# Patient Record
Sex: MALE | Race: BLACK OR AFRICAN AMERICAN | Employment: FULL TIME | URBAN - METROPOLITAN AREA
[De-identification: names, ages, dates, MRNs, and addresses within clinical notes are randomized per-mention and may not be internally consistent; named-entity substitution may affect disease eponyms.]

---

## 2020-09-05 ENCOUNTER — HOSPITAL ENCOUNTER (EMERGENCY)
Facility: CLINIC | Age: 35
Discharge: HOME | End: 2020-09-05
Attending: PHYSICIAN ASSISTANT | Admitting: PHYSICIAN ASSISTANT

## 2020-09-05 ENCOUNTER — APPOINTMENT (OUTPATIENT)
Dept: GENERAL RADIOLOGY | Facility: CLINIC | Age: 35
End: 2020-09-05
Attending: PHYSICIAN ASSISTANT

## 2020-09-05 ENCOUNTER — HOSPITAL ENCOUNTER (EMERGENCY)
Facility: CLINIC | Age: 35
End: 2020-09-05
Payer: COMMERCIAL

## 2020-09-05 VITALS
SYSTOLIC BLOOD PRESSURE: 140 MMHG | HEART RATE: 82 BPM | OXYGEN SATURATION: 100 % | TEMPERATURE: 98.9 F | DIASTOLIC BLOOD PRESSURE: 88 MMHG | RESPIRATION RATE: 16 BRPM

## 2020-09-05 DIAGNOSIS — S69.92XA INJURY OF LEFT THUMB, INITIAL ENCOUNTER: ICD-10-CM

## 2020-09-05 DIAGNOSIS — S60.10XA SUBUNGUAL HEMATOMA OF DIGIT OF HAND, INITIAL ENCOUNTER: ICD-10-CM

## 2020-09-05 PROCEDURE — 73140 X-RAY EXAM OF FINGER(S): CPT | Mod: LT

## 2020-09-05 PROCEDURE — 99283 EMERGENCY DEPT VISIT LOW MDM: CPT

## 2020-09-05 PROCEDURE — 11740 EVACUATION SUBUNGUAL HMTMA: CPT

## 2020-09-05 ASSESSMENT — ENCOUNTER SYMPTOMS
NUMBNESS: 1
MYALGIAS: 1

## 2020-09-05 NOTE — LETTER
September 5, 2020      To Whom It May Concern:      Ken Kong was seen in our Emergency Department today, 09/05/20.  I expect his condition to improve over the next 2 days.  He may return to work when improved.    Sincerely,        Duke Ba PA-C

## 2020-09-05 NOTE — ED PROVIDER NOTES
History     Chief Complaint: Thumb Discomfort       HPI   Ken Kong is a left-handed 34 year old male who presents with thumb pain. The patient reports that he was adjusting [employer] boulders when his thumb was caught between two of them, at which point he quickly pulled his thumb out. He states that he did not immediately notice any injury, but over time the thumb became visibly bruised, sore, and difficult to move. At this time, his thumb is in pain, with a throbbing sensation through it, and the distal portion is numb. He denies an obvious crush injury, and the rest of his hand is not in any pain.    Allergies:  No known drug allergies     Medications:    The patient is currently on no regular medications.    Past Medical History:    History reviewed. The patient does not have any past pertinent medical history.    Past Surgical History:    History reviewed. No pertinent surgical history.      Family History:    History reviewed. No pertinent family history.       Social History:  The patient works in [employer] and [employer].      Review of Systems   Musculoskeletal: Positive for myalgias (left thumb).   Neurological: Positive for numbness (distal left thumb).   All other systems reviewed and are negative.      Physical Exam     Patient Vitals for the past 24 hrs:   BP Temp Temp src Pulse Resp SpO2   09/05/20 1000 (!) 145/100 98.9  F (37.2  C) Oral 90 18 100 %       Physical Exam  HEENT: mmm  Eyes: PERRL B/L  Neck: Supple  CV: Peripheral pulses intact and regular  Resp: Speaking in full sentences without any respiratory distress  Ext:  Left Hand  TTP of distal phalanx of thumb  Full ROM of thumb.  Sensation intact to thumb distally.  <2 sec cap refill.  Normal strength against resistance of thumb.  Otherwise normal ROM, no TTP, sensation intact, <2 sec cap refill.  Remainder of the skeletal survey is unremarkable  Skin: warm dry well perfused  Neuro: Alert  Nursing notes and vital signs  reviewed.    Emergency Department Course   Procedures:  PROCEDURE:Nail trephination  Indication: subungal hematoma  The risks and benefits of the procedure were discussed with the patient.   Narrative: The nail was cleaned with an alcohol swab.  A cautery and then 18 gauge needle was used to trephinate the nail.  A satisfactory amount of blood was expressed.  Pain was improved following the procedure.    Imaging:  Radiology findings were communicated with the patient who voiced understanding of the findings.    Fingers XR, 2-3 views, left  IMPRESSION: No acute bony or soft tissue abnormality related to the thumb. Mild deformity from an old healed fracture involving the fifth metacarpal diaphysis is incidentally noted.  As read by Radiology.     Emergency Department Course:  The patient arrived in the emergency department.  Past medical records, nursing notes, and vitals reviewed.    1005: I performed an exam of the patient and obtained history, as documented above.    The patient was sent for an x-ray while in the emergency department, findings above.     Nail trephination performed.    1035: Findings and plan explained to the Patient. Patient discharged home with instructions regarding supportive care, medications, and reasons to return. The importance of close follow-up was reviewed.    I personally reviewed the imaging results with the Patient and answered all related questions prior to discharge.     Impression & Plan     Medical Decision Making:  Ken Kong is a 34 year old male who presents to ED for evaluation of thumb pain.  Patient notes very mild crush injury when moving boulders.  See HPI as above for additional details.  Vitals and physical exam as above.  X-ray without evidence for bony abnormality.  Patient does have subungual hematoma.   Nail trephination performed as above.  Patient reports significant improvement of his pain.  Advised Tylenol and ibuprofen for pain. Discussed reasons to  return. All questions answered. Patient discharged to home in stable condition.    Diagnosis:    ICD-10-CM    1. Injury of left thumb, initial encounter  S69.92XA    2. Subungual hematoma of digit of hand, initial encounter  S60.10XA        Disposition: Discharged to home.    Scribe Disclosure:  I, Cee Aziza, am serving as a scribe at 10:02 AM on 9/5/2020 to document services personally performed by Duke Ba PA-C based on my observations and the provider's statements to me.      Cee Ervin  09/05/20  [facility] Emergency Department    This record was created at least in part using electronic voice recognition software, so please excuse any typographical errors.       Duke Ba PA-C  09/05/20 1159

## 2020-09-05 NOTE — ED AVS SNAPSHOT
[facility] Emergency Department  [address]  [facility] [address]  Phone:  [phone]  Fax:  [phone]                                    Ken Kong   MRN: [id number]    Department:  [facility] Emergency Department   Date of Visit:  9/5/2020           After Visit Summary Signature Page    I have received my discharge instructions, and my questions have been answered. I have discussed any challenges I see with this plan with the nurse or doctor.    ..........................................................................................................................................  Patient/Patient Representative Signature      ..........................................................................................................................................  Patient Representative Print Name and Relationship to Patient    ..................................................               ................................................  Date                                   Time    ..........................................................................................................................................  Reviewed by Signature/Title    ...................................................              ..............................................  Date                                               Time          22EPIC Rev 08/18

## 2023-04-24 NOTE — ED TRIAGE NOTES
Last seen 09/21/2023, last filled 10/25/2022.  Next appt scheduled 09/21/2023.   Pt c/o left thumb pain since yesterday. 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 Able to bend it but it is difficult to pick stuff up. Has not tried anything for the pain.